# Patient Record
Sex: MALE | Race: WHITE | NOT HISPANIC OR LATINO | Employment: FULL TIME | URBAN - METROPOLITAN AREA
[De-identification: names, ages, dates, MRNs, and addresses within clinical notes are randomized per-mention and may not be internally consistent; named-entity substitution may affect disease eponyms.]

---

## 2018-11-01 ENCOUNTER — OFFICE VISIT (OUTPATIENT)
Dept: FAMILY MEDICINE CLINIC | Facility: CLINIC | Age: 38
End: 2018-11-01

## 2018-11-01 VITALS
DIASTOLIC BLOOD PRESSURE: 78 MMHG | OXYGEN SATURATION: 98 % | HEIGHT: 72 IN | SYSTOLIC BLOOD PRESSURE: 122 MMHG | WEIGHT: 177 LBS | TEMPERATURE: 97.8 F | BODY MASS INDEX: 23.98 KG/M2 | HEART RATE: 73 BPM | RESPIRATION RATE: 20 BRPM

## 2018-11-01 DIAGNOSIS — R31.29 MICROHEMATURIA: ICD-10-CM

## 2018-11-01 DIAGNOSIS — R10.32 LLQ ABDOMINAL PAIN: Primary | ICD-10-CM

## 2018-11-01 LAB
BILIRUB BLD-MCNC: NEGATIVE MG/DL
CLARITY, POC: CLEAR
COLOR UR: YELLOW
EXPIRATION DATE: ABNORMAL
GLUCOSE UR STRIP-MCNC: NEGATIVE MG/DL
KETONES UR QL: NEGATIVE
LEUKOCYTE EST, POC: NEGATIVE
Lab: ABNORMAL
NITRITE UR-MCNC: NEGATIVE MG/ML
PH UR: 6.5 [PH] (ref 5–8)
PROT UR STRIP-MCNC: NEGATIVE MG/DL
RBC # UR STRIP: ABNORMAL /UL
SP GR UR: 1.02 (ref 1–1.03)
UROBILINOGEN UR QL: NORMAL

## 2018-11-01 PROCEDURE — 81003 URINALYSIS AUTO W/O SCOPE: CPT | Performed by: FAMILY MEDICINE

## 2018-11-01 PROCEDURE — 99213 OFFICE O/P EST LOW 20 MIN: CPT | Performed by: FAMILY MEDICINE

## 2018-11-01 RX ORDER — BUSPIRONE HYDROCHLORIDE 10 MG/1
10 TABLET ORAL 2 TIMES DAILY
Qty: 30 TABLET | Refills: 1 | Status: SHIPPED | OUTPATIENT
Start: 2018-11-01 | End: 2019-02-14 | Stop reason: SDUPTHER

## 2018-11-01 NOTE — PROGRESS NOTES
"Pamela Sandoval is a 37 y.o. male seen today for Abdominal Pain.     Abdominal Pain   This is a new problem. The current episode started more than 1 month ago (started in August). The problem has been gradually worsening. The pain is located in the LLQ. The pain is mild. The abdominal pain radiates to the scrotum and suprapubic region. Associated symptoms include diarrhea (loose stools) and nausea. Pertinent negatives include no vomiting. Associated symptoms comments: Strong odor to urine  . The pain is aggravated by eating (fried foods). The pain is relieved by being still. Treatments tried: avoiding certain foods.        The following portions of the patient's history were reviewed and updated as appropriate: allergies, current medications, past social history and problem list.    Review of Systems   Respiratory: Negative for shortness of breath.    Cardiovascular: Negative for chest pain.   Gastrointestinal: Positive for abdominal pain, diarrhea (loose stools) and nausea. Negative for vomiting.       Objective   /78   Pulse 73   Temp 97.8 °F (36.6 °C) (Temporal Artery )   Resp 20   Ht 182.9 cm (72\")   Wt 80.3 kg (177 lb)   SpO2 98%   BMI 24.01 kg/m²   Physical Exam   Constitutional: He is oriented to person, place, and time. He appears well-developed and well-nourished.   Cardiovascular: Normal rate and regular rhythm.    No murmur heard.  Pulmonary/Chest: Effort normal and breath sounds normal. He has no rales.   Abdominal: Soft. Bowel sounds are normal. There is tenderness.   There is mild tenderness at the right upper quadrant as well as the umbilicus.  There is also moderate tenderness at the left lower quadrant.  There is no palpable hernia.  No adenopathy.   Genitourinary: Rectum normal and prostate normal.   Neurological: He is alert and oriented to person, place, and time.   Nursing note and vitals reviewed.      Assessment/Plan   Problem List Items Addressed This Visit     None    "   Visit Diagnoses     LLQ abdominal pain    -  Primary    Relevant Orders    POCT urinalysis dipstick, automated (Completed)    Microhematuria          Urinalysis revealed a small amount of blood.  Because of the persistence of his pain over a two-month period of time I believe we need to check for intra-abdominal pathology including diverticulitis and a kidney stone.  We will set him up for a CT of the abdomen and pelvis without contrast.  This may represent irritable bowel syndrome.  We will place him on a course of buspirone 10 mg twice a day.  Drink plenty of fluids to get fiber.  Return to see us in 2 weeks.        Drink plenty fluids.    Rx for Buspirone 10 twice a day #30+1.    Check a CT scan of the abdomen and pelvis without contrast.    Follow up in 2 weeks.              Scribed for Dr Angel Sevilla by Zita Samano CMA.          I, Angel Sevilla MD, personally performed the services described in this documentation, as scribed by Zita Samano in my presence, and is both accurate and complete.

## 2018-11-15 ENCOUNTER — APPOINTMENT (OUTPATIENT)
Dept: CT IMAGING | Facility: HOSPITAL | Age: 38
End: 2018-11-15
Attending: FAMILY MEDICINE

## 2019-02-14 DIAGNOSIS — R10.32 LLQ ABDOMINAL PAIN: ICD-10-CM

## 2019-02-14 RX ORDER — BUSPIRONE HYDROCHLORIDE 10 MG/1
10 TABLET ORAL 2 TIMES DAILY
Qty: 30 TABLET | Refills: 1 | Status: SHIPPED | OUTPATIENT
Start: 2019-02-14 | End: 2020-02-03

## 2019-02-25 ENCOUNTER — OFFICE VISIT (OUTPATIENT)
Dept: FAMILY MEDICINE CLINIC | Facility: CLINIC | Age: 39
End: 2019-02-25

## 2019-02-25 VITALS
WEIGHT: 178.2 LBS | BODY MASS INDEX: 24.14 KG/M2 | HEART RATE: 72 BPM | TEMPERATURE: 97.6 F | RESPIRATION RATE: 17 BRPM | OXYGEN SATURATION: 98 % | SYSTOLIC BLOOD PRESSURE: 140 MMHG | HEIGHT: 72 IN | DIASTOLIC BLOOD PRESSURE: 82 MMHG

## 2019-02-25 DIAGNOSIS — G89.29 CHRONIC ABDOMINAL PAIN: Primary | ICD-10-CM

## 2019-02-25 DIAGNOSIS — R10.9 CHRONIC ABDOMINAL PAIN: Primary | ICD-10-CM

## 2019-02-25 LAB
BILIRUB BLD-MCNC: NEGATIVE MG/DL
CLARITY, POC: CLEAR
COLOR UR: YELLOW
GLUCOSE UR STRIP-MCNC: NEGATIVE MG/DL
KETONES UR QL: NEGATIVE
LEUKOCYTE EST, POC: NEGATIVE
NITRITE UR-MCNC: NEGATIVE MG/ML
PH UR: 7 [PH] (ref 5–8)
PROT UR STRIP-MCNC: NEGATIVE MG/DL
RBC # UR STRIP: NEGATIVE /UL
SP GR UR: 1.02 (ref 1–1.03)
UROBILINOGEN UR QL: NORMAL

## 2019-02-25 PROCEDURE — 81003 URINALYSIS AUTO W/O SCOPE: CPT | Performed by: FAMILY MEDICINE

## 2019-02-25 PROCEDURE — 99214 OFFICE O/P EST MOD 30 MIN: CPT | Performed by: FAMILY MEDICINE

## 2019-02-25 RX ORDER — CIPROFLOXACIN 500 MG/1
500 TABLET, FILM COATED ORAL 2 TIMES DAILY
Qty: 20 TABLET | Refills: 0 | Status: SHIPPED | OUTPATIENT
Start: 2019-02-25 | End: 2020-02-03

## 2019-02-25 NOTE — PROGRESS NOTES
"Subjective   Seth Sandoval is a 38 y.o. male seen today for Abdominal Pain.     Abdominal Pain   This is a new problem. The current episode started 1 to 4 weeks ago. The pain is located in the LLQ. The pain is at a severity of 6/10. The pain is moderate. The abdominal pain radiates to the left flank. Associated symptoms include constipation (occasional), diarrhea (occasional) and nausea. Pertinent negatives include no fever, hematuria or melena. Associated symptoms comments: After eating  . Exacerbated by: pain after urinating.        The following portions of the patient's history were reviewed and updated as appropriate: allergies, current medications, past social history and problem list.    Review of Systems   Constitutional: Negative for fever.   Respiratory: Negative for shortness of breath.    Cardiovascular: Negative for chest pain.   Gastrointestinal: Positive for abdominal pain, constipation (occasional), diarrhea (occasional) and nausea. Negative for melena.   Genitourinary: Positive for flank pain (left). Negative for hematuria.   Musculoskeletal: Positive for neck pain (right side).       Objective   /82   Pulse 72   Temp 97.6 °F (36.4 °C) (Temporal)   Resp 17   Ht 182.9 cm (72\")   Wt 80.8 kg (178 lb 3.2 oz)   SpO2 98%   BMI 24.17 kg/m²   Physical Exam   Constitutional: He appears well-developed and well-nourished. No distress.   HENT:   Mouth/Throat: No oropharyngeal exudate.   Eyes: Conjunctivae are normal. Pupils are equal, round, and reactive to light. No scleral icterus.   Neck: Normal range of motion. Neck supple. No thyromegaly present.   Cardiovascular: Normal rate and regular rhythm.   No murmur heard.  Pulmonary/Chest: Effort normal and breath sounds normal. He has no rales.   Abdominal: Soft. Bowel sounds are normal. He exhibits no distension and no mass. There is tenderness. No hernia.   Examination of the abdomen reveals it to be soft with active bowel sounds.  There is some " mild epigastric as well as umbilical tenderness and also some tenderness at the left lower quadrant.  The left lower quadrant is most tender.  No palpable mass or hernia.   Lymphadenopathy:     He has no cervical adenopathy.   Skin: He is not diaphoretic.   Nursing note and vitals reviewed.      Assessment/Plan   Problem List Items Addressed This Visit     None      Visit Diagnoses     Chronic abdominal pain    -  Primary    Relevant Orders    POCT urinalysis dipstick, automated (Completed)        Continued difficulties with abdominal pain.  I felt at his last visit that he likely had irritable bowel syndrome.  We did do a digital rectal exam at that time which revealed a normal sized prostate.  At this point I am considering the possibility of diverticulitis versus irritable bowel syndrome.  I will go ahead and place him on ciprofloxacin 500 mg twice a day for the next 10 days.  We will obtain a CT of the abdomen and of the pelvis with and without contrast.  Check laboratory studies including a CBC metabolic panel amylase and lipase.  Return to see me here in 1 week.  If we fail to elucidate the cause of his pain then I think referral to gastroenterology will be needed.      Drink plenty fluids.    Continue medications as doing.    Check a CT scan of the abdomen and pelvis with and without IV contrast.   Check a CBC,CMP,Lipase,and Amylase.    Rx for Cipro 500 mg twice a day #20+0.    Follow up in 7 days.                Scribed for Dr Anegl Sevilla by Zita Samano CMA.          I, Angel Sevilla MD, personally performed the services described in this documentation, as scribed by Zita Samano in my presence, and is both accurate and complete.        (Please note that portions of this note were completed with a voice recognition program. Efforts were made to edit the dictations,but occasionally words are mis transcribed.)

## 2019-02-28 ENCOUNTER — HOSPITAL ENCOUNTER (OUTPATIENT)
Dept: CT IMAGING | Facility: HOSPITAL | Age: 39
Discharge: HOME | End: 2019-02-28
Admitting: FAMILY MEDICINE

## 2019-02-28 DIAGNOSIS — R10.9 CHRONIC ABDOMINAL PAIN: ICD-10-CM

## 2019-02-28 DIAGNOSIS — G89.29 CHRONIC ABDOMINAL PAIN: ICD-10-CM

## 2019-02-28 PROCEDURE — 74178 CT ABD&PLV WO CNTR FLWD CNTR: CPT

## 2019-02-28 PROCEDURE — 25010000002 IOPAMIDOL 61 % SOLUTION: Performed by: FAMILY MEDICINE

## 2019-02-28 RX ADMIN — IOPAMIDOL 80 ML: 612 INJECTION, SOLUTION INTRAVENOUS at 09:28

## 2019-03-01 ENCOUNTER — TELEPHONE (OUTPATIENT)
Dept: FAMILY MEDICINE CLINIC | Facility: CLINIC | Age: 39
End: 2019-03-01

## 2019-03-01 NOTE — TELEPHONE ENCOUNTER
I left a message on the patient's telephone.  I reported that the CT of his abdomen and pelvis was normal.  I advised that should his abdominal pain unclear with the course of antibiotic therapy that I would want him to return to see us to give consideration to endoscopy.

## 2020-02-03 ENCOUNTER — OFFICE VISIT (OUTPATIENT)
Dept: FAMILY MEDICINE CLINIC | Facility: CLINIC | Age: 40
End: 2020-02-03

## 2020-02-03 VITALS
HEIGHT: 72 IN | OXYGEN SATURATION: 98 % | WEIGHT: 187.4 LBS | TEMPERATURE: 98 F | RESPIRATION RATE: 18 BRPM | DIASTOLIC BLOOD PRESSURE: 100 MMHG | SYSTOLIC BLOOD PRESSURE: 150 MMHG | BODY MASS INDEX: 25.38 KG/M2 | HEART RATE: 80 BPM

## 2020-02-03 DIAGNOSIS — J02.9 SORE THROAT: ICD-10-CM

## 2020-02-03 DIAGNOSIS — J10.1 INFLUENZA A: Primary | ICD-10-CM

## 2020-02-03 LAB
EXPIRATION DATE: ABNORMAL
EXPIRATION DATE: NORMAL
FLUAV AG NPH QL: POSITIVE
FLUBV AG NPH QL: NEGATIVE
INTERNAL CONTROL: ABNORMAL
INTERNAL CONTROL: NORMAL
Lab: ABNORMAL
Lab: NORMAL
S PYO AG THROAT QL: NEGATIVE

## 2020-02-03 PROCEDURE — 87804 INFLUENZA ASSAY W/OPTIC: CPT | Performed by: FAMILY MEDICINE

## 2020-02-03 PROCEDURE — 99213 OFFICE O/P EST LOW 20 MIN: CPT | Performed by: FAMILY MEDICINE

## 2020-02-03 PROCEDURE — 87880 STREP A ASSAY W/OPTIC: CPT | Performed by: FAMILY MEDICINE

## 2020-02-03 RX ORDER — OSELTAMIVIR PHOSPHATE 75 MG/1
75 CAPSULE ORAL 2 TIMES DAILY
Qty: 10 CAPSULE | Refills: 0 | Status: SHIPPED | OUTPATIENT
Start: 2020-02-03

## 2020-02-03 RX ORDER — LORATADINE 10 MG/1
10 TABLET ORAL DAILY
COMMUNITY

## 2020-02-03 NOTE — PROGRESS NOTES
"Subjective   Seth Sandoval is a 39 y.o. male seen today for Generalized Body Aches (and fever all symptoms started 1/30 ); Cough; Nasal Congestion; and Sore Throat.     Cough   This is a new problem. The current episode started in the past 7 days (about 4 - 5 days ago.). The cough is non-productive. Associated symptoms include chills, a fever, myalgias and a sore throat. Pertinent negatives include no chest pain or shortness of breath. The symptoms are aggravated by lying down. He has tried rest and OTC cough suppressant (Nyquil.) for the symptoms. The treatment provided mild relief.        The following portions of the patient's history were reviewed and updated as appropriate: allergies, current medications, past social history and problem list.    Review of Systems   Constitutional: Positive for chills, fatigue and fever.   HENT: Positive for sore throat.    Respiratory: Positive for cough. Negative for shortness of breath.    Cardiovascular: Negative for chest pain.   Musculoskeletal: Positive for myalgias.       Objective   /100   Pulse 80   Temp 98 °F (36.7 °C) (Temporal)   Resp 18   Ht 182.9 cm (72\")   Wt 85 kg (187 lb 6.4 oz)   SpO2 98%   BMI 25.42 kg/m²   Physical Exam   Constitutional: He appears well-developed and well-nourished.   HENT:   Right Ear: Tympanic membrane is erythematous.   Left Ear: External ear normal.   Mouth/Throat: Oropharynx is clear and moist.   Cardiovascular: Normal rate and regular rhythm.   Pulmonary/Chest: Effort normal and breath sounds normal.   Nursing note and vitals reviewed.      Assessment/Plan   Problem List Items Addressed This Visit     None      Visit Diagnoses     Influenza A    -  Primary    Relevant Orders    POCT Influenza A/B (Completed)    Sore throat        Relevant Orders    POCT rapid strep A (Completed)              Rest and Drink plenty fluids.    Rx for Tamiflu 75 mg twice a day #10+0.    Follow up as needed.              Scribed for Dr Ortiz " Roel by Zita Samano CMA.          I, Angel Sevilla MD, personally performed the services described in this documentation, as scribed by Zita Samano in my presence, and is both accurate and complete.        (Please note that portions of this note were completed with a voice recognition program. Efforts were made to edit the dictations,but occasionally words are mis transcribed.)

## 2024-03-05 ENCOUNTER — OFFICE VISIT (OUTPATIENT)
Dept: FAMILY MEDICINE CLINIC | Facility: CLINIC | Age: 44
End: 2024-03-05
Payer: COMMERCIAL

## 2024-03-05 VITALS
HEIGHT: 72 IN | SYSTOLIC BLOOD PRESSURE: 130 MMHG | HEART RATE: 80 BPM | WEIGHT: 181.8 LBS | DIASTOLIC BLOOD PRESSURE: 88 MMHG | TEMPERATURE: 98.6 F | BODY MASS INDEX: 24.62 KG/M2

## 2024-03-05 DIAGNOSIS — E78.1 HYPERTRIGLYCERIDEMIA: ICD-10-CM

## 2024-03-05 DIAGNOSIS — R03.0 ELEVATED BLOOD PRESSURE READING: Primary | ICD-10-CM

## 2024-03-05 DIAGNOSIS — Z76.89 ENCOUNTER TO ESTABLISH CARE WITH NEW DOCTOR: ICD-10-CM

## 2024-03-05 NOTE — PROGRESS NOTES
New Patient Office Visit      Date: 2024  Patient Name: Seth Sandoval  : 1980   MRN: [id number]     Chief Complaint:    Chief Complaint   Patient presents with    Establish Care    Hypertension     Had sinus surgery approx 2 wks. After surgery could not get BP down. States that it is always fluctuating. Feels sluggish  Post surgery BP was 180/105       History of Present Illness: Seth Sandoval is a 43 y.o. male who presents today as new patient.    Previously seeing Dr. Sevilla for pcp.    Taking no medications currently.      Reports he had sinus surgery about 2 weeks ago.  Dr. Smith and at [facility].  Polyp and deviated septum surgery.      Reports after anesthesia, bp was running high.    Patient also reports at ENT follow bp was 180/105 last Friday.    Was on antibiotic and was taking advil and tylenol.  Was not on decongestant at the time.    Has a bp monitor.    Reports exercises regularly.  Does not smoke.  Occ alcoholic drink.  Runs.              Subjective      Review of Systems:   Review of Systems   Constitutional:  Negative for chills and fever.   Respiratory:  Negative for cough, shortness of breath and wheezing.    Cardiovascular:  Negative for chest pain and leg swelling.   Gastrointestinal:  Negative for diarrhea, nausea and vomiting.   Neurological:  Negative for seizures and syncope.   Psychiatric/Behavioral:  Negative for suicidal ideas.        Past Medical History: History reviewed. No pertinent past medical history.    Past Surgical History:   Past Surgical History:   Procedure Laterality Date    COLONOSCOPY      SINUS SURGERY      TONSILLECTOMY         Family History:   Family History   Problem Relation Age of Onset    COPD Mother     Hypertension Brother     Hypertension Maternal Uncle     Coronary artery disease Other     Lung cancer Other        Social History:   Social History     Socioeconomic History    Marital status:    Tobacco Use    Smoking status: Never      "Passive exposure: Never    Smokeless tobacco: Never   Vaping Use    Vaping status: Never Used   Substance and Sexual Activity    Alcohol use: Yes     Comment: occ 1 per week.    Drug use: No    Sexual activity: Defer       Medications:   Current Outpatient Medications   Medication Sig Dispense Refill    fluticasone (FLONASE) 50 MCG/ACT nasal spray 2 sprays into the nostril(s) as directed by provider Daily.      loratadine (CLARITIN) 10 MG tablet Take 1 tablet by mouth Daily.       No current facility-administered medications for this visit.        Allergies:   Allergies   Allergen Reactions    Penicillins Unknown - Low Severity       Objective     Physical Exam:  Vital Signs:   Vitals:    03/05/24 0813   BP: 130/88   Pulse: 80   Temp: 98.6 °F (37 °C)   TempSrc: Infrared   Weight: 82.5 kg (181 lb 12.8 oz)   Height: 182.9 cm (72.01\")   PainSc: 0-No pain     Body mass index is 24.65 kg/m².   BMI cannot be calculated due to outdated height or weight values.  Please input a current height/weight in Vitals and re-renter BMIFOLLOWUP in Note to pull in correct documentation based on BMI range.         Physical Exam  Vitals and nursing note reviewed.   Constitutional:       Appearance: Normal appearance.   HENT:      Head: Normocephalic and atraumatic.   Neck:      Vascular: No carotid bruit.   Cardiovascular:      Rate and Rhythm: Normal rate and regular rhythm.      Heart sounds: Normal heart sounds. No murmur heard.  Pulmonary:      Effort: Pulmonary effort is normal.      Breath sounds: Normal breath sounds.   Abdominal:      General: Bowel sounds are normal.      Palpations: Abdomen is soft. There is no mass.      Tenderness: There is no abdominal tenderness.   Musculoskeletal:      Right lower leg: No edema.      Left lower leg: No edema.   Skin:     Coloration: Skin is not jaundiced or pale.      Findings: No erythema.   Neurological:      Mental Status: He is alert. Mental status is at baseline.   Psychiatric:        "  Mood and Affect: Mood normal.         Behavior: Behavior normal.         Procedures    POCT Results (if applicable):   Results for orders placed or performed in visit on 02/03/20   POCT Influenza A/B    Specimen: Swab   Result Value Ref Range    Rapid Influenza A Ag Positive (A) Negative    Rapid Influenza B Ag Negative Negative    Internal Control Passed Passed    Lot Number [id number]     Expiration Date 12/11/2021    POCT rapid strep A    Specimen: Swab   Result Value Ref Range    Rapid Strep A Screen Negative Negative, VALID, INVALID, Not Performed    Internal Control Passed Passed    Lot Number [id number]     Expiration Date 04/29/2022        Measures:   Advanced Care Planning:   Patient does not have an advance directive, declines further assistance.    Smoking Cessation:   Does not smoke      Assessment / Plan      Assessment/Plan:     Has labs done at [facility].  [facility] yearly testing.  Done at work.  Patient to have labs sent to our office.        1. Elevated blood pressure reading  Patient to monitor salt intake, and monitor blood pressures.  Short follow-up in 1 month to consider addition of antihypertensive medication.      2. Hypertriglyceridemia  Patient to consider taking over-the-counter visual twice daily to lower triglycerides.  Patient to sign for records of recent lab work from work.    3. Encounter to establish care with new doctor        Part of this note may be an electronic transcription/translation of spoken language to printed text using the Dragon Dictation System.         Vaccine Counseling:      Follow Up:   Return in about 4 weeks (around 4/2/2024) for Follow Up.      DO SOFI May

## 2024-03-05 NOTE — PATIENT INSTRUCTIONS
Consider taking Fish oil to lower triglycerides.  Take medications as ordered.  Low fat, low salt diet.  Exercise as tolerated.  Log blood pressures.

## 2025-05-12 ENCOUNTER — OFFICE VISIT (OUTPATIENT)
Dept: FAMILY MEDICINE CLINIC | Facility: CLINIC | Age: 45
End: 2025-05-12
Payer: COMMERCIAL

## 2025-05-12 VITALS
HEIGHT: 72 IN | TEMPERATURE: 98.2 F | WEIGHT: 189.2 LBS | HEART RATE: 56 BPM | SYSTOLIC BLOOD PRESSURE: 152 MMHG | OXYGEN SATURATION: 97 % | BODY MASS INDEX: 25.63 KG/M2 | RESPIRATION RATE: 20 BRPM | DIASTOLIC BLOOD PRESSURE: 89 MMHG

## 2025-05-12 DIAGNOSIS — R03.0 ELEVATED BLOOD PRESSURE READING: ICD-10-CM

## 2025-05-12 DIAGNOSIS — R07.89 CHEST TIGHTNESS: ICD-10-CM

## 2025-05-12 DIAGNOSIS — M25.512 LEFT SHOULDER PAIN, UNSPECIFIED CHRONICITY: Primary | ICD-10-CM

## 2025-05-12 PROCEDURE — 99214 OFFICE O/P EST MOD 30 MIN: CPT | Performed by: FAMILY MEDICINE

## 2025-05-12 PROCEDURE — 93000 ELECTROCARDIOGRAM COMPLETE: CPT | Performed by: FAMILY MEDICINE

## 2025-05-12 RX ORDER — LISINOPRIL 5 MG/1
5 TABLET ORAL DAILY
Status: CANCELLED | OUTPATIENT
Start: 2025-05-12

## 2025-05-12 NOTE — PROGRESS NOTES
Follow Up Office Visit      Date: 2025   Patient Name: Seth Sandoval  : 1980   MRN: [id number]     Chief Complaint:    Chief Complaint   Patient presents with    Hypertension    Shoulder Pain     X 1 week    Chest Pain    Headache       History of Present Illness: Seth Sandoval is a 44 y.o. male who presents today for bp issues.    Had physical with me last year.    Reports bp was a little high.  Reports bp has felt high today especially.    Reports has had a weird pain in his left shoulderblade and feels it may rsos coming into his chest area.    Reports started feeling bad today.    Reports pain started shifting some and was coming into his shoulder   Exercises regularly and did use weights in the last week.  Usually does not use weights..  Curls and arms.    Reports chest felt tight     Does not take bp meds.    Does not smoke      Subjective      Review of Systems:   Review of Systems   Constitutional:  Negative for chills and fever.   Gastrointestinal:  Positive for diarrhea (this am-x2 today.). Negative for nausea and vomiting.   Neurological:  Positive for headache (occ). Negative for seizures and syncope.   Psychiatric/Behavioral:  Negative for suicidal ideas.        I have reviewed the patients family history, social history, past medical history, past surgical history and have updated it as appropriate.     Medications:     Current Outpatient Medications:     fluticasone (FLONASE) 50 MCG/ACT nasal spray, Administer 2 sprays into the nostril(s) as directed by provider Daily., Disp: , Rfl:     loratadine (CLARITIN) 10 MG tablet, Take 1 tablet by mouth Daily., Disp: , Rfl:     Allergies:   Allergies   Allergen Reactions    Penicillins Other (See Comments)     Patient stated that he is no longer allergic to penicillin has had it since he was a child       Objective     Physical Exam: Please see above  Vital Signs:   Vitals:    25 1506 25 1528   BP: 140/84 152/89   BP Location:  "Right arm Left arm   Patient Position: Sitting Sitting   Cuff Size: Large Adult Large Adult   Pulse: 56    Resp: 20    Temp: 98.2 °F (36.8 °C)    TempSrc: Temporal    SpO2: 97%    Weight: 85.8 kg (189 lb 3.2 oz)    Height: 182.9 cm (72.01\")    PainSc: 0-No pain      Body mass index is 25.65 kg/m².  BMI is within normal parameters. No other follow-up for BMI required.       Physical Exam  Vitals and nursing note reviewed.   Constitutional:       Appearance: Normal appearance.   HENT:      Head: Normocephalic and atraumatic.   Neck:      Vascular: No carotid bruit.   Cardiovascular:      Rate and Rhythm: Normal rate and regular rhythm.      Heart sounds: Normal heart sounds. No murmur heard.  Pulmonary:      Effort: Pulmonary effort is normal.      Breath sounds: Normal breath sounds.   Abdominal:      General: Bowel sounds are normal.      Palpations: Abdomen is soft. There is no mass.      Tenderness: There is no abdominal tenderness.   Musculoskeletal:        Arms:       Right lower leg: No edema.      Left lower leg: No edema.   Skin:     Coloration: Skin is not jaundiced or pale.      Findings: No erythema.   Neurological:      Mental Status: He is alert. Mental status is at baseline.   Psychiatric:         Mood and Affect: Mood normal.         Behavior: Behavior normal.           ECG 12 Lead    Date/Time: 5/12/2025 3:51 PM  Performed by: Emerson Kwong DO    Authorized by: Emerson Kwong DO  Comparison: not compared with previous ECG   Previous ECG: no previous ECG available  Rhythm: sinus rhythm  Rate: bradycardic  BPM: 53  ST Segments: ST segments normal  T Waves: T waves normal  QRS axis: normal    Clinical impression: non-specific ECG          Results:   Labs:   No results found for: \"HGBA1C\", \"CMP\", \"CBCDIFFPANEL\", \"CREAT\", \"TSH\"     POCT Results (if applicable):   Results for orders placed or performed in visit on 02/03/20   POCT Influenza A/B    Collection Time: 02/03/20 11:08 AM    Specimen: Swab "   Result Value Ref Range    Rapid Influenza A Ag Positive (A) Negative    Rapid Influenza B Ag Negative Negative    Internal Control Passed Passed    Lot Number [id number]     Expiration Date 12/11/2021    POCT rapid strep A    Collection Time: 02/03/20 11:09 AM    Specimen: Swab   Result Value Ref Range    Rapid Strep A Screen Negative Negative, VALID, INVALID, Not Performed    Internal Control Passed Passed    Lot Number [id number]     Expiration Date 04/29/2022        PHQ-9: PHQ-9 Total Score:       Imaging:   No valid procedures specified.     Measures:   Advanced Care Planning:   Patient does not have an advance directive, declines further assistance.    Smoking Cessation:   Does not smoke    Assessment / Plan      Assessment/Plan:           1. Left shoulder pain, unspecified chronicity  1-3  ECG showed  no indication of ischemia. No ST elevations opr T wave inversions.      - ECG 12 Lead    2. Elevated blood pressure reading  Considered starting lisinopril 5 mg tablet or losartan 25 to 50 mg tablet for blood pressure.  Patient wanted to wait for now and try to limit salt and caffeine.    Discussed importance of following low-salt diet for blood pressure control.    - ECG 12 Lead    3. Chest tightness  As above.    - ECG 12 Lead        Part of this note may be an electronic transcription/translation of spoken language to printed text using the Dragon Dictation System.      Vaccine Counseling:      Follow Up:   Return in about 4 weeks (around 6/9/2025).      DO SOFI English